# Patient Record
Sex: FEMALE | Race: BLACK OR AFRICAN AMERICAN | NOT HISPANIC OR LATINO | URBAN - METROPOLITAN AREA
[De-identification: names, ages, dates, MRNs, and addresses within clinical notes are randomized per-mention and may not be internally consistent; named-entity substitution may affect disease eponyms.]

---

## 2020-10-08 ENCOUNTER — OFFICE VISIT (OUTPATIENT)
Dept: URBAN - METROPOLITAN AREA CLINIC 115 | Facility: CLINIC | Age: 30
End: 2020-10-08
Payer: OTHER GOVERNMENT

## 2020-10-08 ENCOUNTER — LAB OUTSIDE AN ENCOUNTER (OUTPATIENT)
Dept: URBAN - METROPOLITAN AREA CLINIC 115 | Facility: CLINIC | Age: 30
End: 2020-10-08

## 2020-10-08 ENCOUNTER — WEB ENCOUNTER (OUTPATIENT)
Dept: URBAN - METROPOLITAN AREA CLINIC 115 | Facility: CLINIC | Age: 30
End: 2020-10-08

## 2020-10-08 DIAGNOSIS — K64.4 EXTERNAL HEMORRHOID: ICD-10-CM

## 2020-10-08 DIAGNOSIS — E66.9 OBESITY (BMI 30.0-34.9): ICD-10-CM

## 2020-10-08 DIAGNOSIS — R10.13 EPIGASTRIC PAIN: ICD-10-CM

## 2020-10-08 PROBLEM — 79922009: Status: ACTIVE | Noted: 2020-10-08

## 2020-10-08 PROBLEM — 23913003: Status: ACTIVE | Noted: 2020-10-08

## 2020-10-08 PROBLEM — 443371000124107: Status: ACTIVE | Noted: 2020-10-08

## 2020-10-08 PROCEDURE — G8484 FLU IMMUNIZE NO ADMIN: HCPCS | Performed by: INTERNAL MEDICINE

## 2020-10-08 PROCEDURE — G8417 CALC BMI ABV UP PARAM F/U: HCPCS | Performed by: INTERNAL MEDICINE

## 2020-10-08 PROCEDURE — G8427 DOCREV CUR MEDS BY ELIG CLIN: HCPCS | Performed by: INTERNAL MEDICINE

## 2020-10-08 PROCEDURE — 99203 OFFICE O/P NEW LOW 30 MIN: CPT | Performed by: INTERNAL MEDICINE

## 2020-10-08 PROCEDURE — G9903 PT SCRN TBCO ID AS NON USER: HCPCS | Performed by: INTERNAL MEDICINE

## 2020-10-08 RX ORDER — OMEPRAZOLE 20 MG/1
1 CAPSULE 30 MINUTES BEFORE MORNING MEAL CAPSULE, DELAYED RELEASE ORAL ONCE A DAY
Qty: 30 | Refills: 1 | OUTPATIENT
Start: 2020-10-08

## 2020-10-08 NOTE — PHYSICAL EXAM GASTROINTESTINAL
obese,normal bowel sounds , no masses palpable , no guarding or rigidity , soft, some tenderness on epigastrum, nondistended

## 2020-10-09 ENCOUNTER — OFFICE VISIT (OUTPATIENT)
Dept: URBAN - METROPOLITAN AREA SURGERY CENTER 13 | Facility: SURGERY CENTER | Age: 30
End: 2020-10-09

## 2020-10-09 ENCOUNTER — TELEPHONE ENCOUNTER (OUTPATIENT)
Dept: URBAN - METROPOLITAN AREA CLINIC 82 | Facility: CLINIC | Age: 30
End: 2020-10-09

## 2023-07-31 ENCOUNTER — OFFICE VISIT (OUTPATIENT)
Dept: URBAN - METROPOLITAN AREA CLINIC 115 | Facility: CLINIC | Age: 33
End: 2023-07-31
Payer: OTHER GOVERNMENT

## 2023-07-31 ENCOUNTER — LAB OUTSIDE AN ENCOUNTER (OUTPATIENT)
Dept: URBAN - METROPOLITAN AREA CLINIC 115 | Facility: CLINIC | Age: 33
End: 2023-07-31

## 2023-07-31 ENCOUNTER — DASHBOARD ENCOUNTERS (OUTPATIENT)
Age: 33
End: 2023-07-31

## 2023-07-31 VITALS
HEIGHT: 67 IN | DIASTOLIC BLOOD PRESSURE: 66 MMHG | BODY MASS INDEX: 32.68 KG/M2 | TEMPERATURE: 98.7 F | HEART RATE: 61 BPM | SYSTOLIC BLOOD PRESSURE: 102 MMHG | WEIGHT: 208.2 LBS

## 2023-07-31 DIAGNOSIS — Z87.19: ICD-10-CM

## 2023-07-31 DIAGNOSIS — R10.12 LEFT UPPER QUADRANT PAIN: ICD-10-CM

## 2023-07-31 DIAGNOSIS — R13.14 PHARYNGOESOPHAGEAL DYSPHAGIA: ICD-10-CM

## 2023-07-31 PROBLEM — 40739000: Status: ACTIVE | Noted: 2023-07-31

## 2023-07-31 PROCEDURE — 99214 OFFICE O/P EST MOD 30 MIN: CPT | Performed by: INTERNAL MEDICINE

## 2023-07-31 RX ORDER — LANSOPRAZOLE 30 MG/1
1 CAPSULE BEFORE A MEAL CAPSULE, DELAYED RELEASE ORAL ONCE A DAY
Status: ACTIVE | COMMUNITY

## 2023-07-31 RX ORDER — FAMOTIDINE 20 MG/1
1 TABLET AT BEDTIME AS NEEDED TABLET, FILM COATED ORAL ONCE A DAY
Status: ACTIVE | COMMUNITY

## 2023-07-31 RX ORDER — OMEPRAZOLE 20 MG/1
1 CAPSULE 30 MINUTES BEFORE MORNING MEAL CAPSULE, DELAYED RELEASE ORAL ONCE A DAY
Qty: 30 | Refills: 1 | Status: ACTIVE | COMMUNITY
Start: 2020-10-08

## 2023-07-31 RX ORDER — BUPROPION HYDROCHLORIDE 100 MG/1
1 TABLET IN THE MORNING TABLET, FILM COATED ORAL ONCE A DAY
Status: ACTIVE | COMMUNITY

## 2023-07-31 RX ORDER — LUBIPROSTONE 24 UG/1
1 CAPSULE WITH FOOD AND WATER CAPSULE, GELATIN COATED ORAL TWICE A DAY
Status: ACTIVE | COMMUNITY

## 2023-07-31 NOTE — HPI-TODAY'S VISIT:
31 y/o black female presents for dysphagia and bloating. States bloating is under her left breast.  Has 3cm nodule on thyroid.  Has had 2 bxp in past that were benign.  Will have another bxp soon as nodules have increased. Thyroid levels are normal.  States couldn't tolerate esophagus manometry. Barium swallow at [facility] 2021 found reflux to level of cricopharyngeal and small hiatal hernia.  No strictures.  Saw Cardiolgist, they state is not her heart.  States cardiac score was zero and her stress test was normal.  GB removed 2 years ago.  Was poorly functioning.  Did not help her LUQ pain.  Feels when eating goes down slowly.  Feels need to burp.  Was seen by Dr. Albrecht in 2020 for epigastrc pain.  EGD was ordered but not compelted. EGD 2 years ago found gastritis.  Also bowel movements are infrequent, at least twice per week.  Taking Amitiza.  Previously on Trulance, and Linzess.  Neither worked.  Has to rock to get bowel out.  Doesn't feel like it comes out. Has had 3 pregancies (c-sections). Has been diagnosed with pelvic floor dysfunction and will be starting pelvic floor PT next month.

## 2023-08-10 ENCOUNTER — OFFICE VISIT (OUTPATIENT)
Dept: URBAN - METROPOLITAN AREA SURGERY CENTER 13 | Facility: SURGERY CENTER | Age: 33
End: 2023-08-10
Payer: OTHER GOVERNMENT

## 2023-08-10 ENCOUNTER — CLAIMS CREATED FROM THE CLAIM WINDOW (OUTPATIENT)
Dept: URBAN - METROPOLITAN AREA CLINIC 4 | Facility: CLINIC | Age: 33
End: 2023-08-10
Payer: OTHER GOVERNMENT

## 2023-08-10 DIAGNOSIS — K31.A0 GASTRIC INTESTINAL METAPLASIA, UNSPECIFIED: ICD-10-CM

## 2023-08-10 DIAGNOSIS — K29.60 OTHER GASTRITIS WITHOUT BLEEDING: ICD-10-CM

## 2023-08-10 DIAGNOSIS — R13.19 DYSPHAGIA: ICD-10-CM

## 2023-08-10 PROCEDURE — 88305 TISSUE EXAM BY PATHOLOGIST: CPT | Performed by: PATHOLOGY

## 2023-08-10 PROCEDURE — 43239 EGD BIOPSY SINGLE/MULTIPLE: CPT | Performed by: INTERNAL MEDICINE

## 2023-08-10 PROCEDURE — 88342 IMHCHEM/IMCYTCHM 1ST ANTB: CPT | Performed by: PATHOLOGY

## 2023-08-10 PROCEDURE — G8907 PT DOC NO EVENTS ON DISCHARG: HCPCS | Performed by: INTERNAL MEDICINE

## 2024-03-01 ENCOUNTER — OV EP (OUTPATIENT)
Dept: URBAN - METROPOLITAN AREA CLINIC 92 | Facility: CLINIC | Age: 34
End: 2024-03-01

## 2024-03-01 NOTE — HPI-TODAY'S VISIT:
31 y/o black female presents for dysphagia and bloating. States bloating is under her left breast.  Has 3cm nodule on thyroid.  Has had 2 bxp in past that were benign.  Will have another bxp soon as nodules have increased. Thyroid levels are normal.  States couldn't tolerate esophagus manometry. Barium swallow at [facility] 2021 found reflux to level of cricopharyngeal and small hiatal hernia.  No strictures.  Saw Cardiolgist, they state is not her heart.  States cardiac score was zero and her stress test was normal.  GB removed 2 years ago.  Was poorly functioning.  Did not help her LUQ pain.  Feels when eating goes down slowly.  Feels need to burp.  Was seen by Dr. Albrecht in 2020 for epigastrc pain.  EGD was ordered but not compelted. EGD 2 years ago found gastritis.  Also bowel movements are infrequent, at least twice per week.  Taking Amitiza.  Previously on Trulance, and Linzess.  Neither worked.  Has to rock to get bowel out.  Doesn't feel like it comes out. Has had 3 pregancies (c-sections). Has been diagnosed with pelvic floor dysfunction and will be starting pelvic floor PT next month.   EGD on 8/10/23 with Dr. Kath Herrera revealed normal esophagusand duodenum, erythematous mucosa in antrum, bx reveal chronic inactive gastritischanges suggestive of treated H. pylori gastritis, neg. for IM